# Patient Record
Sex: FEMALE | Race: WHITE | NOT HISPANIC OR LATINO | URBAN - METROPOLITAN AREA
[De-identification: names, ages, dates, MRNs, and addresses within clinical notes are randomized per-mention and may not be internally consistent; named-entity substitution may affect disease eponyms.]

---

## 2018-03-19 ENCOUNTER — HOSPITAL ENCOUNTER (EMERGENCY)
Facility: HOSPITAL | Age: 73
Discharge: HOME | End: 2018-03-19
Attending: EMERGENCY MEDICINE
Payer: MEDICARE

## 2018-03-19 ENCOUNTER — HOSPITAL ENCOUNTER (OUTPATIENT)
Facility: CLINIC | Age: 73
Discharge: OTHER INSTITUTION | End: 2018-03-19
Attending: FAMILY MEDICINE
Payer: MEDICARE

## 2018-03-19 VITALS
HEIGHT: 61 IN | DIASTOLIC BLOOD PRESSURE: 79 MMHG | OXYGEN SATURATION: 96 % | TEMPERATURE: 97.7 F | RESPIRATION RATE: 18 BRPM | WEIGHT: 155 LBS | SYSTOLIC BLOOD PRESSURE: 118 MMHG | HEART RATE: 75 BPM | BODY MASS INDEX: 29.27 KG/M2

## 2018-03-19 VITALS
TEMPERATURE: 99.1 F | WEIGHT: 155 LBS | HEIGHT: 61 IN | SYSTOLIC BLOOD PRESSURE: 75 MMHG | RESPIRATION RATE: 20 BRPM | BODY MASS INDEX: 29.27 KG/M2 | OXYGEN SATURATION: 95 % | HEART RATE: 82 BPM | DIASTOLIC BLOOD PRESSURE: 51 MMHG

## 2018-03-19 DIAGNOSIS — R05.9 COUGH: ICD-10-CM

## 2018-03-19 DIAGNOSIS — J10.1 INFLUENZA B: Primary | ICD-10-CM

## 2018-03-19 DIAGNOSIS — R55 SYNCOPE AND COLLAPSE: Primary | ICD-10-CM

## 2018-03-19 LAB
ANION GAP SERPL CALC-SCNC: 11 MEQ/L (ref 3–15)
ANISOCYTOSIS BLD QL SMEAR: ABNORMAL
BASOPHILS # BLD: 0.01 K/UL (ref 0.01–0.1)
BASOPHILS NFR BLD: 0.3 %
BUN SERPL-MCNC: 11 MG/DL (ref 8–20)
CALCIUM SERPL-MCNC: 8.8 MG/DL (ref 8.9–10.3)
CHLORIDE SERPL-SCNC: 102 MMOL/L (ref 98–109)
CO2 SERPL-SCNC: 24 MMOL/L (ref 22–32)
CREAT SERPL-MCNC: 0.8 MG/DL (ref 0.6–1.1)
EOSINOPHIL # BLD: 0.03 K/UL (ref 0.04–0.36)
EOSINOPHIL NFR BLD: 0.8 %
ERYTHROCYTE [DISTWIDTH] IN BLOOD BY AUTOMATED COUNT: 16.8 % (ref 11.7–14.4)
FLUAV RNA SPEC QL NAA+PROBE: NEGATIVE
FLUBV RNA SPEC QL NAA+PROBE: POSITIVE
GFR SERPL CREATININE-BSD FRML MDRD: >60 ML/MIN/1.73M*2
GLUCOSE BLD-MCNC: 93 MG/DL (ref 70–99)
GLUCOSE SERPL-MCNC: 96 MG/DL (ref 70–99)
HCT VFR BLDCO AUTO: 35 % (ref 35–45)
HGB BLD-MCNC: 11.3 G/DL (ref 11.8–15.7)
IMM GRANULOCYTES # BLD AUTO: 0.01 K/UL (ref 0–0.08)
IMM GRANULOCYTES NFR BLD AUTO: 0.3 %
INR PPP: 2.2 INR
LYMPHOCYTES # BLD: 0.82 K/UL (ref 1.2–3.5)
LYMPHOCYTES NFR BLD: 22.3 %
MCH RBC QN AUTO: 24.5 PG (ref 28–33.2)
MCHC RBC AUTO-ENTMCNC: 32.3 G/DL (ref 32.2–35.5)
MCV RBC AUTO: 75.9 FL (ref 83–98)
MICROCYTES BLD QL SMEAR: ABNORMAL
MONOCYTES # BLD: 0.46 K/UL (ref 0.28–0.8)
MONOCYTES NFR BLD: 12.5 %
NEUTROPHILS # BLD: 2.35 K/UL (ref 1.7–7)
NEUTS SEG NFR BLD: 63.8 %
NRBC BLD-RTO: 0 %
OVALOCYTES BLD QL SMEAR: ABNORMAL
PDW BLD AUTO: 10.7 FL (ref 9.4–12.3)
PLAT MORPH BLD: NORMAL
PLATELET # BLD AUTO: 235 K/UL (ref 150–369)
PLATELET # BLD EST: ABNORMAL 10*3/UL
POTASSIUM SERPL-SCNC: 3.7 MMOL/L (ref 3.6–5.1)
PROTHROMBIN TIME: 24.7 SEC. (ref 12.2–14.5)
RBC # BLD AUTO: 4.61 M/UL (ref 3.93–5.22)
SODIUM SERPL-SCNC: 137 MMOL/L (ref 136–144)
TROPONIN I SERPL-MCNC: <0.03 NG/ML
WBC # BLD AUTO: 3.68 K/UL (ref 3.8–10.5)

## 2018-03-19 PROCEDURE — 93005 ELECTROCARDIOGRAM TRACING: CPT

## 2018-03-19 PROCEDURE — 3E0337Z INTRODUCTION OF ELECTROLYTIC AND WATER BALANCE SUBSTANCE INTO PERIPHERAL VEIN, PERCUTANEOUS APPROACH: ICD-10-PCS | Performed by: EMERGENCY MEDICINE

## 2018-03-19 PROCEDURE — 84484 ASSAY OF TROPONIN QUANT: CPT | Performed by: EMERGENCY MEDICINE

## 2018-03-19 PROCEDURE — 85610 PROTHROMBIN TIME: CPT | Performed by: EMERGENCY MEDICINE

## 2018-03-19 PROCEDURE — 63600000 HC DRUGS/DETAIL CODE: Performed by: EMERGENCY MEDICINE

## 2018-03-19 PROCEDURE — 85025 COMPLETE CBC W/AUTO DIFF WBC: CPT | Performed by: EMERGENCY MEDICINE

## 2018-03-19 PROCEDURE — 80048 BASIC METABOLIC PNL TOTAL CA: CPT | Performed by: EMERGENCY MEDICINE

## 2018-03-19 PROCEDURE — 87502 INFLUENZA DNA AMP PROBE: CPT | Performed by: EMERGENCY MEDICINE

## 2018-03-19 PROCEDURE — 25800000 HC PHARMACY IV SOLUTIONS: Performed by: EMERGENCY MEDICINE

## 2018-03-19 PROCEDURE — 99285 EMERGENCY DEPT VISIT HI MDM: CPT | Mod: 25

## 2018-03-19 PROCEDURE — 99204 OFFICE O/P NEW MOD 45 MIN: CPT | Performed by: FAMILY MEDICINE

## 2018-03-19 PROCEDURE — 36415 COLL VENOUS BLD VENIPUNCTURE: CPT | Performed by: EMERGENCY MEDICINE

## 2018-03-19 PROCEDURE — 93005 ELECTROCARDIOGRAM TRACING: CPT | Performed by: EMERGENCY MEDICINE

## 2018-03-19 PROCEDURE — 96360 HYDRATION IV INFUSION INIT: CPT

## 2018-03-19 RX ORDER — HYDROCHLOROTHIAZIDE 25 MG/1
25 TABLET ORAL
COMMUNITY
Start: 2018-01-09 | End: 2021-01-16

## 2018-03-19 RX ORDER — OMEPRAZOLE 20 MG/1
20 CAPSULE, DELAYED RELEASE ORAL
COMMUNITY
Start: 2018-01-09

## 2018-03-19 RX ORDER — AMLODIPINE BESYLATE 5 MG/1
5 TABLET ORAL
COMMUNITY
Start: 2018-01-09

## 2018-03-19 RX ORDER — RIVAROXABAN 15 MG/1
TABLET, FILM COATED ORAL
Refills: 0 | COMMUNITY
Start: 2017-12-18 | End: 2018-03-19 | Stop reason: ENTERED-IN-ERROR

## 2018-03-19 RX ORDER — OSELTAMIVIR PHOSPHATE 75 MG/1
75 CAPSULE ORAL
Qty: 10 CAPSULE | Refills: 0 | Status: SHIPPED | OUTPATIENT
Start: 2018-03-19 | End: 2018-03-24

## 2018-03-19 RX ADMIN — SODIUM CHLORIDE 1000 ML: 9 INJECTION, SOLUTION INTRAVENOUS at 14:35

## 2018-03-19 ASSESSMENT — ENCOUNTER SYMPTOMS
DIARRHEA: 0
DECREASED APPETITE: 1
BACK PAIN: 0
COUGH: 1
NAUSEA: 1
SORE THROAT: 0
DIFFICULTY URINATING: 0
FREQUENCY: 0
FLU SYMPTOMS: 1
SHORTNESS OF BREATH: 0
FATIGUE: 1
BLOOD IN STOOL: 0
VOMITING: 0
RHINORRHEA: 1
HEADACHES: 0
MYALGIAS: 1
FEVER: 1
DYSURIA: 0

## 2018-03-19 NOTE — DISCHARGE INSTRUCTIONS
You presented with flu like syptoms.  Your blood pressure was low during triage, and while seated, you lost consciousness, and the medical assistant with you safely caught you and laid you down on the floor.  Mental status returned to normal within 2 minutes of LOC.  There was no shaking or loss of continence to suggest seizure.  EMS was called.  They arrived very quickly.  Verbal signout given by me to EMS, including above events, that she's on a blood thinner, and that I think she should have a head CT given age, being on blood thinner, and loss of consciousness.

## 2018-03-19 NOTE — ED PROVIDER NOTES
History  Chief Complaint   Patient presents with   • Cough     runny nose, myalgia, cough, fever (100)      See [facility] for brief eval prior to departing with EMS.  Exam & ROS were not full/complete due to emergency nature of syncope.  Appeared grossly normal, other than a little sweaty, after LOC resolved.             Past Medical History:   Diagnosis Date   • Hypertension    • Reflux esophagitis        History reviewed. No pertinent surgical history.    No family history on file.    Social History   Substance Use Topics   • Smoking status: Never Smoker   • Smokeless tobacco: Never Used   • Alcohol use No       Review of Systems    Physical Exam  ED Triage Vitals   Temp Heart Rate Resp BP SpO2   03/19/18 1134 03/19/18 1134 03/19/18 1134 03/19/18 1146 03/19/18 1134   37.3 °C (99.1 °F) 82 20 (!) 75/51 95 %      Temp Source Heart Rate Source Patient Position BP Location FiO2 (%) (Set)   03/19/18 1134 03/19/18 1134 03/19/18 1134 03/19/18 1134 --   Oral Monitor Sitting Left upper arm        Physical Exam   Constitutional: She is oriented to person, place, and time. She appears well-developed and well-nourished. No distress.   HENT:   Head: Normocephalic and atraumatic.   Neck: Neck supple.   Pulmonary/Chest: Effort normal.   Abdominal: Soft. There is no guarding.   Neurological: She is alert and oriented to person, place, and time.   Skin: She is diaphoretic.   Psychiatric: She has a normal mood and affect. Her behavior is normal.         Procedures  Procedures    UC Course  ED Course as of Mar 19 1155   Mon Mar 19, 2018   1154 Departed in [facility] at 11:55am with EMS, in stable condition.    [GJ]      ED Course User Index  [GJ] Ayush Wolfe MD         Clinical Impressions as of Mar 19 1155   Syncope and collapse   Cough       MDM  Number of Diagnoses or Management Options  Cough:   Syncope and collapse:   Diagnosis management comments: Transferred to ER via EMS for further eval of syncope & flu like symptoms.                   Ayush Wolfe MD  03/19/18 0173

## 2018-03-19 NOTE — ED PROVIDER NOTES
HPI     Chief Complaint   Patient presents with   • Flu Symptoms         History provided by:  Patient   used: No    Flu Symptoms   Presenting symptoms: cough, fatigue, fever, myalgias, nausea and rhinorrhea    Presenting symptoms: no diarrhea, no headaches, no shortness of breath, no sore throat and no vomiting    Severity:  Moderate  Onset quality:  Gradual  Chronicity:  New  Associated symptoms: decreased appetite and syncope (pt syncopized at urgent care)    Risk factors: being elderly and sick contacts ( w/ similar sx)    Risk factors comment:  Pt was dx'd w/ a PE in Dec 2017, unknown cause  Pt was seen at urgent care today for her flu-like sx where she had a syncopal event, was caught by nurse/doctor who was present. Pt notes she felt the urgent care was warm and believes that may have been why she passed out. Pt was significantly hypotensive at the urgent care w/ BP of 75/51.     Patient History     Past Medical History:   Diagnosis Date   • Hypertension    • Reflux esophagitis        History reviewed. No pertinent surgical history.    History reviewed. No pertinent family history.    Social History   Substance Use Topics   • Smoking status: Never Smoker   • Smokeless tobacco: Never Used   • Alcohol use No       Systems Reviewed from Nursing Triage:          Review of Systems     Review of Systems   Constitutional: Positive for decreased appetite, fatigue and fever.   HENT: Positive for rhinorrhea. Negative for sore throat.    Respiratory: Positive for cough. Negative for shortness of breath.    Cardiovascular: Negative for chest pain.   Gastrointestinal: Positive for nausea. Negative for blood in stool (no black stools), diarrhea and vomiting.   Genitourinary: Negative for difficulty urinating, dysuria and frequency.   Musculoskeletal: Positive for myalgias. Negative for back pain.   Neurological: Negative for headaches.   All other systems reviewed and are negative.       Physical  "Exam     ED Triage Vitals [03/19/18 1221]   Temp Heart Rate Resp BP SpO2   36.7 °C (98.1 °F) 70 16 123/73 95 %      Temp Source Heart Rate Source Patient Position BP Location FiO2 (%) (Set)   Oral -- -- -- --                     Patient Vitals for the past 24 hrs:   BP Temp Temp src Pulse Resp SpO2 Height Weight   03/19/18 1221 123/73 36.7 °C (98.1 °F) Oral 70 16 95 % 1.549 m (5' 1\") 70.3 kg (155 lb)           Physical Exam   Constitutional: She appears well-developed and well-nourished.   HENT:   Head: Normocephalic and atraumatic.   Eyes: Conjunctivae are normal.   Neck: Normal range of motion.   Cardiovascular: Normal rate, regular rhythm, normal heart sounds and intact distal pulses.    Pulmonary/Chest: Effort normal and breath sounds normal.   Abdominal: Soft. She exhibits no distension and no mass. There is no tenderness.   Musculoskeletal: Normal range of motion. She exhibits no tenderness.   Neurological: She is alert. No cranial nerve deficit.   Skin: Skin is warm and dry. Capillary refill takes less than 2 seconds.   Psychiatric: She has a normal mood and affect. Her behavior is normal.   Nursing note and vitals reviewed.           ECG 12 lead  Date/Time: 3/19/2018 1:34 PM  Performed by: CURTIS RICHTER  Authorized by: CURTIS RICHTER     Rate:     ECG rate:  68    ECG rate assessment: normal    Rhythm:     Rhythm: sinus rhythm    Ectopy:     Ectopy: none    QRS:     QRS axis:  Normal    QRS intervals:  Normal  ST segments:     ST segments:  Normal  T waves:     T waves: normal          ED Course & MDM     Labs Reviewed   POCT GLUCOSE (BEAKER) - Normal       Result Value    POCT Bedside Glucose 93     INFLUENZA A/B NUCLEIC ACID, PCR       ECG 12 lead    (Results Pending)           MDM  Number of Diagnoses or Management Options     Amount and/or Complexity of Data Reviewed  Clinical lab tests: reviewed      73 y/o F who presents w/ flu-like sx and syncopal event. Ddx includes orthostasis viral " illness, dehydration, renal failure.       ED Course as of Mar 19 2125   Mon Mar 19, 2018   1500 Patient feeling better, is influenza B positive.  Encouraged to increase fluid intake and follow up with PCP  [MALI]      ED Course User Index  [MALI] Beena Brennan MD         Clinical Impressions as of Mar 19 2125   Influenza B     Disposition:       Scribe Attestation  By signing my name below, I, Delonte Jean-Baptiste, attest that this documentation has been prepared under the direction and in the presence of Dr. Brennan.    3/19/2018 1:38 PM    I, Beena Brennan, personally performed the services described in this documentation, as documented by the scribe in my presence, and it is both accurate and complete.  3/19/2018 9:24 PM           Delonte Jean-Baptiste  03/19/18 1341       Beena Brennan MD  03/19/18 2125

## 2018-08-01 ENCOUNTER — APPOINTMENT (OUTPATIENT)
Dept: CT IMAGING | Facility: HOSPITAL | Age: 73
End: 2018-08-01
Payer: MEDICARE

## 2018-08-01 ENCOUNTER — HOSPITAL ENCOUNTER (EMERGENCY)
Facility: HOSPITAL | Age: 73
Discharge: HOME | End: 2018-08-01
Attending: EMERGENCY MEDICINE
Payer: MEDICARE

## 2018-08-01 VITALS
HEIGHT: 61 IN | OXYGEN SATURATION: 97 % | WEIGHT: 157 LBS | HEART RATE: 79 BPM | BODY MASS INDEX: 29.64 KG/M2 | TEMPERATURE: 98.4 F | SYSTOLIC BLOOD PRESSURE: 123 MMHG | DIASTOLIC BLOOD PRESSURE: 67 MMHG | RESPIRATION RATE: 16 BRPM

## 2018-08-01 DIAGNOSIS — Z86.711 HISTORY OF PULMONARY EMBOLUS (PE): ICD-10-CM

## 2018-08-01 DIAGNOSIS — R07.9 ACUTE CHEST PAIN: ICD-10-CM

## 2018-08-01 DIAGNOSIS — R07.9 CHEST PAIN, UNSPECIFIED TYPE: Primary | ICD-10-CM

## 2018-08-01 DIAGNOSIS — R73.9 HYPERGLYCEMIA: ICD-10-CM

## 2018-08-01 DIAGNOSIS — Z79.01 CHRONIC ANTICOAGULATION: ICD-10-CM

## 2018-08-01 LAB
ALBUMIN SERPL-MCNC: 4.3 G/DL (ref 3.5–5.3)
ALP SERPL-CCNC: 106 U/L (ref 55–142)
ALT SERPL-CCNC: 15 U/L (ref 0–52)
ANION GAP SERPL CALC-SCNC: 11 MMOL/L (ref 3–11)
AST SERPL-CCNC: 19 U/L (ref 0–39)
BASOPHILS # BLD AUTO: 0.1 10*3/UL
BASOPHILS NFR BLD AUTO: 1 % (ref 0–2)
BILIRUB SERPL-MCNC: 0.44 MG/DL (ref 0–1.4)
BNP SERPL-MCNC: 45 PG/ML (ref 0–99)
BUN SERPL-MCNC: 10 MG/DL (ref 7–25)
CALCIUM ALBUM COR SERPL-MCNC: 9.6 MG/DL (ref 8.5–10.1)
CALCIUM SERPL-MCNC: 9.8 MG/DL (ref 8.6–10.3)
CHLORIDE SERPL-SCNC: 102 MMOL/L (ref 98–107)
CO2 SERPL-SCNC: 23 MMOL/L (ref 21–32)
CREAT SERPL-MCNC: 0.7 MG/DL (ref 0.6–1.2)
EOSINOPHIL # BLD AUTO: 0.1 10*3/UL
EOSINOPHIL NFR BLD AUTO: 2 % (ref 0–3)
ERYTHROCYTE [DISTWIDTH] IN BLOOD BY AUTOMATED COUNT: 16.7 % (ref 11.5–14)
GFR SERPL CREATININE-BSD FRML MDRD: 82 ML/MIN/1.73M*2
GLUCOSE SERPL-MCNC: 143 MG/DL (ref 70–105)
HCT VFR BLD AUTO: 39.1 % (ref 34–45)
HGB BLD-MCNC: 12.9 G/DL (ref 11.5–15.5)
HYPOCHROMIA PRESENCE IN BLOOD, ANALYZER: ABNORMAL
LYMPHOCYTES # BLD AUTO: 1.5 10*3/UL
LYMPHOCYTES NFR BLD AUTO: 26 % (ref 11–47)
MCH RBC QN AUTO: 25.2 PG (ref 28–33)
MCHC RBC AUTO-ENTMCNC: 33 G/DL (ref 32–36)
MCV RBC AUTO: 76.4 FL (ref 81–97)
MICROCYTOSIS PRESENCE IN BLOOD, ANALYZER: ABNORMAL
MONOCYTES # BLD AUTO: 0.5 10*3/UL
MONOCYTES NFR BLD AUTO: 8 % (ref 3–11)
NEUTROPHILS # BLD AUTO: 3.7 10*3/UL
NEUTROPHILS NFR BLD AUTO: 63 % (ref 41–81)
PLATELET # BLD AUTO: 292 10*3/UL (ref 140–350)
PMV BLD AUTO: 9.3 FL (ref 6.9–10.8)
POTASSIUM SERPL-SCNC: 3.5 MMOL/L (ref 3.5–5.1)
PROT SERPL-MCNC: 7.8 G/DL (ref 6–8.3)
RBC # BLD AUTO: 5.11 10*6/ΜL (ref 3.7–5.3)
SODIUM SERPL-SCNC: 136 MMOL/L (ref 135–145)
TROPONIN I SERPL-MCNC: <0.03 NG/ML
WBC # BLD AUTO: 5.9 10*3/UL (ref 4.5–10.5)

## 2018-08-01 PROCEDURE — 99284 EMERGENCY DEPT VISIT MOD MDM: CPT

## 2018-08-01 PROCEDURE — 2580000300 HC RX 258: Performed by: EMERGENCY MEDICINE

## 2018-08-01 PROCEDURE — 36415 COLL VENOUS BLD VENIPUNCTURE: CPT | Performed by: EMERGENCY MEDICINE

## 2018-08-01 PROCEDURE — 80053 COMPREHEN METABOLIC PANEL: CPT | Performed by: EMERGENCY MEDICINE

## 2018-08-01 PROCEDURE — 99284 EMERGENCY DEPT VISIT MOD MDM: CPT | Performed by: EMERGENCY MEDICINE

## 2018-08-01 PROCEDURE — 6370000100 HC RX 637 (ALT 250 FOR IP): Performed by: EMERGENCY MEDICINE

## 2018-08-01 PROCEDURE — 85025 COMPLETE CBC W/AUTO DIFF WBC: CPT | Performed by: EMERGENCY MEDICINE

## 2018-08-01 PROCEDURE — 83880 ASSAY OF NATRIURETIC PEPTIDE: CPT | Performed by: EMERGENCY MEDICINE

## 2018-08-01 PROCEDURE — 2550000100 HC RX 255: Mod: JW | Performed by: EMERGENCY MEDICINE

## 2018-08-01 PROCEDURE — 71275 CT ANGIOGRAPHY CHEST: CPT

## 2018-08-01 PROCEDURE — 93005 ELECTROCARDIOGRAM TRACING: CPT | Performed by: EMERGENCY MEDICINE

## 2018-08-01 PROCEDURE — 84484 ASSAY OF TROPONIN QUANT: CPT | Performed by: EMERGENCY MEDICINE

## 2018-08-01 RX ORDER — HYDROCHLOROTHIAZIDE 25 MG/1
25 TABLET ORAL EVERY MORNING
COMMUNITY

## 2018-08-01 RX ORDER — IOPAMIDOL 755 MG/ML
80 INJECTION, SOLUTION INTRAVASCULAR ONCE
Status: COMPLETED | OUTPATIENT
Start: 2018-08-01 | End: 2018-08-01

## 2018-08-01 RX ORDER — NITROGLYCERIN 0.4 MG/1
0.4 TABLET SUBLINGUAL EVERY 5 MIN PRN
Status: DISCONTINUED | OUTPATIENT
Start: 2018-08-01 | End: 2018-08-01 | Stop reason: HOSPADM

## 2018-08-01 RX ORDER — SODIUM CHLORIDE 9 MG/ML
1000 INJECTION, SOLUTION INTRAVENOUS ONCE
Status: COMPLETED | OUTPATIENT
Start: 2018-08-01 | End: 2018-08-01

## 2018-08-01 RX ORDER — OMEPRAZOLE 20 MG/1
20 CAPSULE, DELAYED RELEASE ORAL EVERY MORNING
COMMUNITY

## 2018-08-01 RX ORDER — AMLODIPINE BESYLATE 5 MG/1
5 TABLET ORAL EVERY MORNING
COMMUNITY

## 2018-08-01 RX ADMIN — NITROGLYCERIN 0.4 MG: 0.4 TABLET, ORALLY DISINTEGRATING SUBLINGUAL at 14:00

## 2018-08-01 RX ADMIN — SODIUM CHLORIDE 1000 ML: 9 INJECTION, SOLUTION INTRAVENOUS at 14:00

## 2018-08-01 RX ADMIN — IOPAMIDOL 80 ML: 755 INJECTION, SOLUTION INTRAVENOUS at 14:40

## 2018-08-01 ASSESSMENT — PAIN DESCRIPTION - DESCRIPTORS
DESCRIPTORS: ACHING
DESCRIPTORS: ACHING

## 2018-08-01 NOTE — DISCHARGE INSTRUCTIONS
Take your medicines as prescribed.  Follow-up with your primary care physician.  Return to this emergency department if any symptoms worsen before then.

## 2018-08-01 NOTE — ED PROVIDER NOTES
Chest Pain (MID CHEST PAIN ON AND OFF SINCE YESTERDAY.  NONRADIATING.  ALSO C/O DIZZINESS AND FATIGUE.)        HPI:  This is a 72-year-old female reports emergency department with complaints of chest pain.    Patient states that approximately 24 hours ago she began experiencing chest pain.  She describes this to be an achy type discomfort in her left upper chest.  She has some radiation to her left shoulder.  She denies any sensation of diaphoresis or nausea, she does state that she has some shortness of breath.  She is unaware of anything that worsens or improves her pain.  Symptoms have been of moderate intensity since onset 24 hours ago.  Patient is traveling on a tour bus from [place].          Past Medical History:   Diagnosis Date   • GERD (gastroesophageal reflux disease)    • Hypertension        No past surgical history on file.    Social History     Social History   • Marital status:      Spouse name: N/A   • Number of children: N/A   • Years of education: N/A     Occupational History   • Not on file.     Social History Main Topics   • Smoking status: Not on file   • Smokeless tobacco: Not on file   • Alcohol use Not on file   • Drug use: Unknown   • Sexual activity: Not on file     Other Topics Concern   • Not on file     Social History Narrative   • No narrative on file       No family history on file.    No Known Allergies      Current Outpatient Prescriptions:   •  amLODIPine (NORVASC) 5 mg tablet, Take 5 mg by mouth every morning., Disp: , Rfl:   •  hydroCHLOROthiazide (HYDRODIURIL) 25 mg tablet, Take 25 mg by mouth every morning., Disp: , Rfl:   •  omeprazole (PriLOSEC) 20 mg capsule, Take 20 mg by mouth every morning., Disp: , Rfl:   •  rivaroxaban (XARELTO) 20 mg tablet, Take 20 mg by mouth every morning.  , Disp: , Rfl:       ROS:  Constitutional: negative for fever  Eyes: negative for visual changes  ENT: negative for sore throat  Cardiovascular: Positive for chest pain  Respiratory:  Positive for shortness of breath   GI: negative for abdominal pain  : negative for hematuria  Musculoskeletal: negative for back pain  Neuro: negative for headache  Hematology: negative for bleeding  Immunology: negative for joint pain      ED Triage Vitals   Temp Heart Rate Resp BP SpO2   08/01/18 1324 08/01/18 1324 08/01/18 1403 08/01/18 1324 08/01/18 1324   36.9 °C (98.4 °F) 84 16 172/84 97 %      Temp src Heart Rate Source Patient Position BP Location FiO2 (%)   -- -- 08/01/18 1403 -- --     Head of bed 30 degrees or higher           Physical Exam:  Nursing note and vitals reviewed.  Constitutional: Obese elderly female.  She answers questions appropriately and does not appear toxic.  Head: Normocephalic and atraumatic. Mucous membranes are moist.   Eyes: Pupils are equal, round, and reactive to light.   Neck: Supple.  Cardiovascular: Regular rate and rhythm without murmur.   Pulmonary/Chest: No respiratory distress.  Clear to auscultation bilaterally.  Abdominal: Soft and nontender.    Back: No CVA tenderness. No midline tenderness.   Musculoskeletal: No edema  Neurological: Alert.   Skin: Skin is warm and dry. No rash noted.   Psychiatric: Normal mood and affect.        Labs:  Labs Reviewed   CBC WITH AUTO DIFFERENTIAL - Abnormal        Result Value    WBC 5.9      RBC 5.11      Hemoglobin 12.9      Hematocrit 39.1      MCV 76.4 (*)     MCH 25.2 (*)     MCHC 33.0      RDW 16.7 (*)     Platelets 292      MPV 9.3      Neutrophils% 63      Lymphocytes% 26      Monocytes% 8      Eosinophils% 2      Basophils% 1      Neutrophils Absolute 3.70      Lymphocytes Absolute 1.50      Monocytes Absolute 0.50      Eosinophils Absolute 0.10      Basophils Absolute 0.10      Microcytosis 1+      Hypochromia 1+     COMPREHENSIVE METABOLIC PANEL - Abnormal     Sodium 136      Potassium 3.5      Chloride 102      CO2 23      Anion Gap 11      BUN 10      Creatinine 0.7      Glucose 143 (*)     Calcium 9.8      AST 19      ALT  (SGPT) 15      Alkaline Phosphatase 106      Total Protein 7.8      Albumin 4.3      Total Bilirubin 0.44      eGFR 82      Corrected Calcium 9.6      Narrative:     ESTIMATED GFR CALCULATED USING THE IDMS-TRACEABLE MDRD STUDY EQUATION WITH THE RESULT NORMALIZED TO 1.73M^2 BODY SURFACE AREA.    AVERAGE GFR BY AGE RANGE     20-30 years 116 mL/min/1.73m^2  30-40 years 107 mL/min/1.73m^2  40-50 years 99 mL/min/1.73m^2  50-60 years 93 mL/min/1.73m^2  60-70 years 85 mL/min/1.73m^2  70-up years 75 mL/min/1.73m^2   TROPONIN I - Normal    Troponin I <0.030     B-TYPE NATRIURETIC PEPTIDE (BNP) - Normal    BNP 45      Narrative:     B-TYPE NATRIURETIC PEPTIDE INTERPRETIVE DATA: A cutoff of 100 pg/mL has been demonstrated to provide the maximal combination of sensitivity, specificity, and negative predictive value for contributing to the diagnosis of congestive heart failure (CHF).  A BNP value greater than or equal to 100 pg/mL is consistent with a diagnosis of CHF in the appropriate clinical setting.       Imaging:  CT angiogram chest PE with IV contrast   Final Result   IMPRESSION:   No evidence of pulmonary emboli. There is either some fibrosis or atelectasis in the left lower lobe, especially surrounding a moderately large hiatal hernia sac.      There is some dilatation of the esophagus above the hernia sac. Potentially, this could represent fluid from reflux.          MDM:        Given   Medications   nitroglycerin (NITROSTAT) SL tablet 0.4 mg (0.4 mg sublingual Given 8/1/18 1400)   normal saline bolus 1,000 mL (1,000 mL intravenous New Bag/New Syringe 8/1/18 1400)   iopamidol (ISOVUE-370) 76 % injection 80 mL (80 mL intravenous Given 8/1/18 1440)         Procedure    ECG 12 lead  Date/Time: 8/1/2018 1:58 PM  Performed by: MICA JACKSON  Authorized by: MICA JACKSON     Comments:      Sinus rhythm with a rate of 84.  Leftward axis deviation.  Normal intervals.  Good R-wave progression.  No acute ischemic changes.  No  ectopy.              Clinical Impression:    Final diagnoses:   [R07.9] Chest pain, unspecified type   [Z79.01] Chronic anticoagulation   [Z86.711] History of pulmonary embolus (PE)   [R07.9] Acute chest pain   [R73.9] Hyperglycemia         I, Varun Oliveira MD, personally performed the services described in this documentation as transcribed by an emergency department scribe, in my presence, and it is both accurate and complete.      Varun Oliveira MD  08/01/18 7172

## 2018-08-01 NOTE — MEDICATION HISTORY SPECIALIST NOTES
CSN: [id number]  : [id number]    Information sources:  EPIC  List on patient's phone    Allergies verified.    Patient interviewed in ED. Updated Epic per patient and list on her phone. Patient verified med list, last doses, and allergies.     **High alert medications**  Rivoroxaban (Xarelto)

## 2018-08-17 ENCOUNTER — LAB REQUISITION (OUTPATIENT)
Dept: LAB | Facility: HOSPITAL | Age: 73
End: 2018-08-17
Attending: OBSTETRICS & GYNECOLOGY
Payer: MEDICARE

## 2018-08-17 DIAGNOSIS — R39.15 URGENCY OF URINATION: ICD-10-CM

## 2018-08-17 LAB
BACTERIA URNS QL MICRO: NORMAL /UL
BILIRUB UR QL STRIP.AUTO: NEGATIVE MG/DL
CLARITY UR REFRACT.AUTO: CLEAR
COLOR UR AUTO: YELLOW
GLUCOSE UR STRIP.AUTO-MCNC: NEGATIVE MG/DL
HGB UR QL STRIP.AUTO: NEGATIVE
HYALINE CASTS #/AREA URNS LPF: NORMAL /LPF
KETONES UR STRIP.AUTO-MCNC: NEGATIVE MG/DL
LEUKOCYTE ESTERASE UR QL STRIP.AUTO: NEGATIVE
NITRITE UR QL STRIP.AUTO: NEGATIVE
PH UR STRIP.AUTO: 6 [PH]
PROT UR QL STRIP.AUTO: NEGATIVE
RBC #/AREA URNS HPF: NORMAL /HPF
SP GR UR REFRACT.AUTO: 1.02
SQUAMOUS URNS QL MICRO: NORMAL /HPF
UROBILINOGEN UR STRIP-ACNC: 0.2 EU/DL
WBC #/AREA URNS HPF: NORMAL /HPF

## 2018-08-17 PROCEDURE — 81001 URINALYSIS AUTO W/SCOPE: CPT | Performed by: OBSTETRICS & GYNECOLOGY

## 2018-08-18 LAB — EXTRA TUBES HOLD SPECIMEN: NORMAL

## 2018-11-20 ENCOUNTER — HOSPITAL ENCOUNTER (OUTPATIENT)
Dept: RADIOLOGY | Age: 73
Discharge: HOME | End: 2018-11-20
Attending: REHABILITATION PRACTITIONER
Payer: MEDICARE

## 2018-11-20 ENCOUNTER — TRANSCRIBE ORDERS (OUTPATIENT)
Dept: RADIOLOGY | Age: 73
End: 2018-11-20

## 2018-11-20 DIAGNOSIS — M25.562 PAIN IN LEFT KNEE: Primary | ICD-10-CM

## 2018-11-20 DIAGNOSIS — M22.2X2 PATELLOFEMORAL DISORDER OF LEFT KNEE: ICD-10-CM

## 2018-11-20 DIAGNOSIS — M25.562 PAIN IN LEFT KNEE: ICD-10-CM

## 2018-11-20 PROCEDURE — 73564 X-RAY EXAM KNEE 4 OR MORE: CPT | Mod: LT

## 2019-09-20 ENCOUNTER — HOSPITAL ENCOUNTER (OUTPATIENT)
Facility: CLINIC | Age: 74
Discharge: HOME | End: 2019-09-20
Attending: FAMILY MEDICINE
Payer: MEDICARE

## 2019-09-20 VITALS
WEIGHT: 158 LBS | SYSTOLIC BLOOD PRESSURE: 124 MMHG | RESPIRATION RATE: 17 BRPM | BODY MASS INDEX: 29.83 KG/M2 | OXYGEN SATURATION: 96 % | HEIGHT: 61 IN | DIASTOLIC BLOOD PRESSURE: 73 MMHG | TEMPERATURE: 97.8 F | HEART RATE: 87 BPM

## 2019-09-20 DIAGNOSIS — M25.562 PAIN IN LATERAL PORTION OF LEFT KNEE: Primary | ICD-10-CM

## 2019-09-20 PROCEDURE — 99213 OFFICE O/P EST LOW 20 MIN: CPT | Performed by: FAMILY MEDICINE

## 2019-09-20 ASSESSMENT — ENCOUNTER SYMPTOMS
JOINT SWELLING: 0
COLOR CHANGE: 0

## 2019-09-20 NOTE — ED PROVIDER NOTES
History  Chief Complaint   Patient presents with   • Knee Injury     Two day history of L lateral knee pain that began after exercise.  She denies any history of injury.            Past Medical History:   Diagnosis Date   • Hypertension    • Reflux esophagitis        History reviewed. No pertinent surgical history.    History reviewed. No pertinent family history.    Social History   Substance Use Topics   • Smoking status: Never Smoker   • Smokeless tobacco: Never Used   • Alcohol use No       Review of Systems   Musculoskeletal: Negative for joint swelling.   Skin: Negative for color change.       Physical Exam  ED Triage Vitals [09/20/19 1321]   Temp Heart Rate Resp BP SpO2   36.6 °C (97.8 °F) 87 17 124/73 96 %      Temp src Heart Rate Source Patient Position BP Location FiO2 (%) (Set)   -- -- -- -- --       Physical Exam   Constitutional: She appears well-developed and well-nourished. No distress.   HENT:   Head: Normocephalic and atraumatic.   Eyes: Conjunctivae are normal.   Neck: Neck supple.   Cardiovascular: Normal rate and regular rhythm.    No murmur heard.  Pulmonary/Chest: Effort normal and breath sounds normal. No respiratory distress.   Abdominal: Soft. There is no tenderness.   Musculoskeletal: She exhibits no edema.        Left knee: She exhibits LCL laxity. She exhibits normal range of motion, no swelling and no effusion. Tenderness found. LCL tenderness noted. No medial joint line, no lateral joint line and no MCL tenderness noted.   Neurological: She is alert.   Skin: Skin is warm and dry.   Psychiatric: She has a normal mood and affect.   Nursing note and vitals reviewed.        Procedures  Procedures    UC Course  Clinical Impressions as of Sep 20 1404   Pain in lateral portion of left knee       MDM  Number of Diagnoses or Management Options  Pain in lateral portion of left knee:   Diagnosis management comments: May take tylenol for pain, elevate knee, may use ice for 24 hours or moist heat.   Avoid activities that cause pain and follow up with your physician if symptoms persist.                   Lennie Castillo MD  09/20/19 7220

## 2019-09-20 NOTE — DISCHARGE INSTRUCTIONS
May take tylenol for pain, elevate knee, may use ice for 24 hours or moist heat.  Avoid activities that cause pain and follow up with your physician if symptoms persist.

## 2019-12-10 ENCOUNTER — LAB REQUISITION (OUTPATIENT)
Dept: LAB | Facility: HOSPITAL | Age: 74
End: 2019-12-10
Attending: OBSTETRICS & GYNECOLOGY
Payer: MEDICARE

## 2019-12-10 DIAGNOSIS — Z01.419 ENCOUNTER FOR GYNECOLOGICAL EXAMINATION (GENERAL) (ROUTINE) WITHOUT ABNORMAL FINDINGS: ICD-10-CM

## 2019-12-10 PROCEDURE — G0145 SCR C/V CYTO,THINLAYER,RESCR: HCPCS | Performed by: OBSTETRICS & GYNECOLOGY

## 2019-12-19 LAB
CASE RPRT: NORMAL
CLINICAL INFO: NORMAL
THIN PREP CVX: NORMAL

## 2021-01-16 ENCOUNTER — APPOINTMENT (EMERGENCY)
Dept: RADIOLOGY | Facility: HOSPITAL | Age: 76
End: 2021-01-16
Attending: EMERGENCY MEDICINE
Payer: MEDICARE

## 2021-01-16 ENCOUNTER — HOSPITAL ENCOUNTER (EMERGENCY)
Facility: HOSPITAL | Age: 76
Discharge: HOME | End: 2021-01-16
Attending: EMERGENCY MEDICINE
Payer: MEDICARE

## 2021-01-16 VITALS
TEMPERATURE: 97.9 F | SYSTOLIC BLOOD PRESSURE: 156 MMHG | OXYGEN SATURATION: 99 % | HEART RATE: 72 BPM | WEIGHT: 156 LBS | DIASTOLIC BLOOD PRESSURE: 74 MMHG | HEIGHT: 61 IN | RESPIRATION RATE: 18 BRPM | BODY MASS INDEX: 29.45 KG/M2

## 2021-01-16 DIAGNOSIS — R07.9 CHEST PAIN, UNSPECIFIED TYPE: Primary | ICD-10-CM

## 2021-01-16 LAB
ALBUMIN SERPL-MCNC: 3.6 G/DL (ref 3.4–5)
ALP SERPL-CCNC: 88 IU/L (ref 35–126)
ALT SERPL-CCNC: 16 IU/L (ref 11–54)
ANION GAP SERPL CALC-SCNC: 11 MEQ/L (ref 3–15)
AST SERPL-CCNC: 21 IU/L (ref 15–41)
ATRIAL RATE: 80
BASOPHILS # BLD: 0.05 K/UL (ref 0.01–0.1)
BASOPHILS NFR BLD: 0.9 %
BILIRUB SERPL-MCNC: 0.8 MG/DL (ref 0.3–1.2)
BUN SERPL-MCNC: 11 MG/DL (ref 8–20)
CALCIUM SERPL-MCNC: 9.6 MG/DL (ref 8.9–10.3)
CHLORIDE SERPL-SCNC: 106 MEQ/L (ref 98–109)
CO2 SERPL-SCNC: 23 MEQ/L (ref 22–32)
CREAT SERPL-MCNC: 0.7 MG/DL (ref 0.6–1.1)
D DIMER PPP IA.FEU-MCNC: 1.22 UG/ML FEU (ref 0–0.5)
DIFFERENTIAL METHOD BLD: ABNORMAL
EOSINOPHIL # BLD: 0.17 K/UL (ref 0.04–0.36)
EOSINOPHIL NFR BLD: 3.2 %
ERYTHROCYTE [DISTWIDTH] IN BLOOD BY AUTOMATED COUNT: 16.5 % (ref 11.7–14.4)
GFR SERPL CREATININE-BSD FRML MDRD: >60 ML/MIN/1.73M*2
GLUCOSE SERPL-MCNC: 112 MG/DL (ref 70–99)
HCT VFR BLDCO AUTO: 40.4 % (ref 35–45)
HGB BLD-MCNC: 12.6 G/DL (ref 11.8–15.7)
IMM GRANULOCYTES # BLD AUTO: 0.01 K/UL (ref 0–0.08)
IMM GRANULOCYTES NFR BLD AUTO: 0.2 %
LYMPHOCYTES # BLD: 1.36 K/UL (ref 1.2–3.5)
LYMPHOCYTES NFR BLD: 25.3 %
MCH RBC QN AUTO: 25.2 PG (ref 28–33.2)
MCHC RBC AUTO-ENTMCNC: 31.2 G/DL (ref 32.2–35.5)
MCV RBC AUTO: 80.8 FL (ref 83–98)
MONOCYTES # BLD: 0.46 K/UL (ref 0.28–0.8)
MONOCYTES NFR BLD: 8.6 %
NEUTROPHILS # BLD: 3.32 K/UL (ref 1.7–7)
NEUTS SEG NFR BLD: 61.8 %
NRBC BLD-RTO: 0 %
P AXIS: 54
PDW BLD AUTO: 11.3 FL (ref 9.4–12.3)
PLATELET # BLD AUTO: 291 K/UL (ref 150–369)
POTASSIUM SERPL-SCNC: 4.1 MEQ/L (ref 3.6–5.1)
PR INTERVAL: 156
PROT SERPL-MCNC: 7.2 G/DL (ref 6–8.2)
QRS DURATION: 80
QT INTERVAL: 414
QTC CALCULATION(BAZETT): 477
R AXIS: 5
RBC # BLD AUTO: 5 M/UL (ref 3.93–5.22)
SODIUM SERPL-SCNC: 140 MEQ/L (ref 136–144)
T WAVE AXIS: 21
TROPONIN I SERPL-MCNC: <0.03 NG/ML
TROPONIN I SERPL-MCNC: <0.03 NG/ML
VENTRICULAR RATE: 80
WBC # BLD AUTO: 5.37 K/UL (ref 3.8–10.5)

## 2021-01-16 PROCEDURE — 80053 COMPREHEN METABOLIC PANEL: CPT | Performed by: EMERGENCY MEDICINE

## 2021-01-16 PROCEDURE — 84484 ASSAY OF TROPONIN QUANT: CPT | Performed by: EMERGENCY MEDICINE

## 2021-01-16 PROCEDURE — 85379 FIBRIN DEGRADATION QUANT: CPT | Performed by: EMERGENCY MEDICINE

## 2021-01-16 PROCEDURE — 36415 COLL VENOUS BLD VENIPUNCTURE: CPT | Performed by: EMERGENCY MEDICINE

## 2021-01-16 PROCEDURE — 85025 COMPLETE CBC W/AUTO DIFF WBC: CPT | Performed by: EMERGENCY MEDICINE

## 2021-01-16 PROCEDURE — 63600105 HC IODINE BASED CONTRAST: Performed by: EMERGENCY MEDICINE

## 2021-01-16 PROCEDURE — 93005 ELECTROCARDIOGRAM TRACING: CPT | Performed by: EMERGENCY MEDICINE

## 2021-01-16 PROCEDURE — 71260 CT THORAX DX C+: CPT | Mod: ME

## 2021-01-16 PROCEDURE — 71045 X-RAY EXAM CHEST 1 VIEW: CPT

## 2021-01-16 PROCEDURE — 99284 EMERGENCY DEPT VISIT MOD MDM: CPT | Mod: 25

## 2021-01-16 PROCEDURE — 73030 X-RAY EXAM OF SHOULDER: CPT | Mod: LT

## 2021-01-16 RX ORDER — CHOLECALCIFEROL (VITAMIN D3) 25 MCG
1000 TABLET ORAL DAILY
COMMUNITY

## 2021-01-16 RX ADMIN — IOHEXOL 80 ML: 350 INJECTION, SOLUTION INTRAVENOUS at 13:30

## 2021-01-16 ASSESSMENT — ENCOUNTER SYMPTOMS
TROUBLE SWALLOWING: 0
ALTERED MENTAL STATUS: 0
SYNCOPE: 0
HEARTBURN: 0
SHORTNESS OF BREATH: 0
ORTHOPNEA: 0
BACK PAIN: 0
ABDOMINAL PAIN: 0
ANOREXIA: 0
FEVER: 0
VOMITING: 0

## 2021-01-16 NOTE — ED PROVIDER NOTES
HPI     Chief Complaint   Patient presents with   • Chest Pain       76 yo female with pmh of HTN and PE who presents with left shoulder pain that started today.  It has been constant since it started and radiates into left chest.  Nothing makes it better or worse.  No n/v, no diaphoresis, no sob, no leg pain or swelling.  No fever/chills, no cough.        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 No pertinent surgical history.    History reviewed. No pertinent family history.    Social History     Tobacco Use   • Smoking status: Never Smoker   • Smokeless tobacco: Never Used   Substance Use Topics   • Alcohol use: No   • Drug use: Not Currently       Systems Reviewed from Nursing Triage:          Review of Systems     Review of Systems   Constitutional: Negative for fever.   HENT: Negative for trouble swallowing.    Respiratory: Negative for shortness of breath.    Cardiovascular: Positive for chest pain. Negative for orthopnea and syncope.   Gastrointestinal: Negative for abdominal pain, anorexia, heartburn and vomiting.   Musculoskeletal: Negative for back pain.   All other systems reviewed and are negative.       Physical Exam     ED Vitals    Date/Time Temp Pulse Resp BP SpO2 [facility]   01/16/21 1411 -- 73 16 166/77 98 %  JL   01/16/21 1345 -- 76 14 133/74 98 % KMP   01/16/21 1200 -- 73 18 137/71 99 % RJA   01/16/21 1049 36.6 °C (97.9 °F) 78 16 162/74 97 % MRP                                                           Physical Exam  Constitutional:       Appearance: She is well-developed.   HENT:      Head: Normocephalic and atraumatic.   Neck:      Musculoskeletal: Normal range of motion and neck supple.   Cardiovascular:      Rate and Rhythm: Normal rate and regular rhythm.      Heart sounds: Normal heart sounds.   Pulmonary:      Effort: Pulmonary effort is normal.   Abdominal:      General: Bowel sounds are normal.      Palpations: Abdomen is soft.      Tenderness: There is no abdominal tenderness.   Musculoskeletal: Normal range of motion.   Skin:     General: Skin is warm and dry.      Capillary Refill: Capillary refill takes less than 2 seconds.   Neurological:      General: No focal deficit present.      Mental Status: She is alert and oriented to person, place, and time.   Psychiatric:         Mood and Affect: Mood normal.              Procedures    Results     Procedure Component Value Units Date/Time    D-dimer, quantitative [[id number]]  (Abnormal) Collected: 01/16/21 1106    Specimen: Blood, Venous Updated: 01/16/21 1259     D-Dimer, Quant 1.22 ug/mL FEU      Comment: Suggested Age Related Reference Range: 0.00 - 0.77  The D-Dimer assay can be used as an aid in the diagnosis of DVT or PE. The test can not be used by itself to exclude DVT or PE. When used as a diagnostic aid, the cutoff value is the same as the reference range: <0.5 ug/ml FEU.       Comprehensive metabolic panel [[id number]]  (Abnormal) Collected: 01/16/21 1106    Specimen: Blood, Venous Updated: 01/16/21 1143     Sodium 140 mEQ/L      Potassium 4.1 mEQ/L      Comment: Results obtained on plasma. Plasma Potassium values may be up to 0.4 mEQ/L less than serum values. The differences may be greater for patients with high platelet or white cell counts.         Chloride 106 mEQ/L      CO2 23 mEQ/L      BUN 11 mg/dL      Creatinine 0.7 mg/dL      Glucose 112 mg/dL      Calcium 9.6 mg/dL      AST (SGOT) 21 IU/L      ALT (SGPT) 16 IU/L      Alkaline Phosphatase 88 IU/L      Total Protein 7.2 g/dL      Comment: Test performed on plasma which typically contains approximately 0.4 g/dL more protein than serum.        Albumin 3.6 g/dL      Bilirubin, Total 0.8 mg/dL      eGFR >60.0 mL/min/1.73m*2      Anion Gap 11 mEQ/L     Troponin I [[id number]]  (Normal) Collected: 01/16/21 1106    Specimen: Blood, Venous Updated: 01/16/21 1141     Troponin I <0.03 ng/mL     CBC and differential [[id number]]  (Abnormal) Collected: 01/16/21 1106    Specimen: Blood, Venous Updated: 01/16/21 1122     WBC 5.37 K/uL      RBC 5.00 M/uL      Hemoglobin 12.6 g/dL      Hematocrit 40.4 %      MCV 80.8 fL      MCH 25.2 pg      MCHC 31.2 g/dL      RDW 16.5 %      Platelets 291 K/uL      MPV 11.3 fL      Differential Type Auto     nRBC 0.0 %      Immature Granulocytes 0.2 %      Neutrophils 61.8 %      Lymphocytes 25.3 %      Monocytes 8.6 %      Eosinophils 3.2 %      Basophils 0.9 %      Immature Granulocytes, Absolute 0.01 K/uL      Neutrophils, Absolute 3.32 K/uL      Lymphocytes, Absolute 1.36 K/uL      Monocytes, Absolute 0.46 K/uL      Eosinophils, Absolute 0.17 K/uL      Basophils, Absolute 0.05 K/uL     [place] Draw Panel [[id number]] Collected: 01/16/21 1106    Specimen: Blood, Venous Updated: 01/16/21 1118    Narrative:      The following orders were created for panel order [place] Draw Panel.  Procedure                               Abnormality         Status                     ---------                               -----------         ------                     [employer] LT BLUE[[id number]]                                  In process                   Please view results for these tests on the individual orders.    [employer] BLUE [[id number]] Collected: 01/16/21 1106    Specimen: Blood, Venous  Updated: 01/16/21 1118          Imaging Results          CT CHEST PULMONARY EMBOLISM WITH IV CONTRAST (Final result)  Result time 01/16/21 14:12:00    Final result                 Impression:    IMPRESSION:  1.  No pulmonary embolus.  2.  New tubular lesion within the left upper lobe apex measuring up to 1.7 x 0.5  cm, likely representing mucoid impacted bronchus.  Follow-up nonenhanced chest  CT in 3-6 months can be considered to assess for stability/resolution.                   Narrative:    CLINICAL HISTORY: Positive d-dimer, chest pain    COMPARISON: CT 12/13/2017    COMMENT:    TECHNIQUE: CT of the chest was performed with the patient in the supine  position. Images reconstructed/reformatted in the axial, coronal and  sagittal  plane.  CT DOSE:  One or more dose reduction techniques (e.g. automated exposure  control, adjustment of the mA and/or kV according to patient size, use of  iterative reconstruction technique) utilized for this examination.  INTRAVENOUS CONTRAST: 80 mL of Omnipaque 350 intravenous contrast was  administered without event.  Image quality and contrast bolus timing:  Satisfactory    CHEST WALL AND LOWER NECK:  Unchanged partially calcified left thyroid nodule measuring 1.8 cm.  Partially retropharyngeal common carotid artery course, normal variant.  MEDIASTINUM AND BHARAT:  Moderate hiatal hernia again seen.  Moderate amount of debris within the esophagus.  HEART: Cardiomegaly. No pericardial effusion.  VESSELS:  Atherosclerotic disease of the thoracic aorta.  Normal caliber aorta with the ascending aorta measuring up to 3.6 cm.  Main pulmonary artery caliber is normal.  No primary embolus.  UPPER ABDOMEN:within normal limits.  LUNG, LARGE AIRWAYS AND PLEURA:  Right basilar linear subsegmental volume loss.  Left lower lobe posterior medial subsegmental volume loss adjacent to the hernia  sac.  Unchanged right middle lobe 0.5 cm pulmonary nodule.  New tubular left upper  lobe apical lesion  measuring 1.7 cm craniocaudad in axial image 39 & 0.5 cm AP  on thin slice axial image 46.  BONES: Multilevel degenerative disc disease per                               X-RAY SHOULDER LEFT 2+ VIEWS (Final result)  Result time 01/16/21 12:52:37    Final result                 Impression:    IMPRESSION:No evidence of left shoulder fracture or dislocation.    COMMENT:2 views left shoulder performed.  No prior studies for comparison.  Normal glenohumeral alignment.  Acromioclavicular intervals normal.  No evidence  of an acute fracture or soft tissue swelling.  Mid left arm posterior lateral  soft tissue calcifications with lucent centers, possibly related to a  hemangioma.  Imaged portions of the left lung are clear.             Narrative:    CLINICAL HISTORY: shoulder pain                               X-RAY CHEST 1 VIEW (Final result)  Result time 01/16/21 11:41:23    Final result                 Impression:    IMPRESSION:No evidence of acute disease.    COMMENT:Frontal portable erect view the chest performed.  Comparison made with  examinations dating back to 12/12/2017.  Hiatal hernia again seen.  Left basilar  linear subsegmental volume loss; lungs otherwise clear.  No evidence of a pleural effusion, pneumothorax or pneumomediastinum.             Narrative:    CLINICAL HISTORY: chest pain                                ECG 12 lead   Final Result                  ED Course & MDM     MDM         ED Course as of Jan 17 0713   Sat Jan 16, 2021   1331 Heart score is 3 - will do second troponin - if negative can refer for outpatient follow up    [MALI]   1711 The repeat troponin is negative.  She plans to follow-up with a cardiologist as an outpatient as well as with her primary doctor.  She was given Dr. Pearce's number.  She understands that she should return to the ER if worse in any way before then.  I also explained her CT results to her and her need for a repeat unenhanced CT scan in 3 to 6 months.    [HS]      ED  Course User Index  [HS] Piero Parr (Ed) MD [place]  [MALI] Beena Brennan MD         Clinical Impressions as of Jan 17 0713   Chest pain, unspecified type        Beena Brennan MD  01/16/21 1456       Beena Brennan MD  01/17/21 0794

## 2021-02-05 ENCOUNTER — IMMUNIZATIONS (OUTPATIENT)
Dept: FAMILY MEDICINE CLINIC | Facility: HOSPITAL | Age: 76
End: 2021-02-05

## 2021-02-05 DIAGNOSIS — Z23 ENCOUNTER FOR IMMUNIZATION: Primary | ICD-10-CM

## 2021-02-05 PROCEDURE — 91301 SARS-COV-2 / COVID-19 MRNA VACCINE (MODERNA) 100 MCG: CPT

## 2021-02-05 PROCEDURE — 0011A SARS-COV-2 / COVID-19 MRNA VACCINE (MODERNA) 100 MCG: CPT

## 2021-03-04 ENCOUNTER — IMMUNIZATIONS (OUTPATIENT)
Dept: FAMILY MEDICINE CLINIC | Facility: HOSPITAL | Age: 76
End: 2021-03-04

## 2021-03-04 DIAGNOSIS — Z23 ENCOUNTER FOR IMMUNIZATION: Primary | ICD-10-CM

## 2021-03-04 PROCEDURE — 0012A SARS-COV-2 / COVID-19 MRNA VACCINE (MODERNA) 100 MCG: CPT

## 2021-03-04 PROCEDURE — 91301 SARS-COV-2 / COVID-19 MRNA VACCINE (MODERNA) 100 MCG: CPT

## 2021-03-05 ENCOUNTER — HOSPITAL ENCOUNTER (EMERGENCY)
Facility: HOSPITAL | Age: 76
Discharge: HOME | End: 2021-03-05
Attending: EMERGENCY MEDICINE
Payer: MEDICARE

## 2021-03-05 ENCOUNTER — APPOINTMENT (EMERGENCY)
Dept: RADIOLOGY | Facility: HOSPITAL | Age: 76
End: 2021-03-05
Payer: MEDICARE

## 2021-03-05 VITALS
BODY MASS INDEX: 29.64 KG/M2 | WEIGHT: 157 LBS | DIASTOLIC BLOOD PRESSURE: 68 MMHG | SYSTOLIC BLOOD PRESSURE: 135 MMHG | OXYGEN SATURATION: 100 % | TEMPERATURE: 100.6 F | RESPIRATION RATE: 12 BRPM | HEIGHT: 61 IN | HEART RATE: 72 BPM

## 2021-03-05 DIAGNOSIS — R06.02 SHORTNESS OF BREATH: Primary | ICD-10-CM

## 2021-03-05 DIAGNOSIS — R50.9 FEVER, UNSPECIFIED FEVER CAUSE: ICD-10-CM

## 2021-03-05 PROBLEM — K21.00 GASTROESOPHAGEAL REFLUX DISEASE WITH ESOPHAGITIS: Status: ACTIVE | Noted: 2017-11-24

## 2021-03-05 PROBLEM — I26.92 ACUTE SADDLE PULMONARY EMBOLISM WITHOUT ACUTE COR PULMONALE (CMS/HCC): Status: ACTIVE | Noted: 2021-03-05

## 2021-03-05 LAB
ATRIAL RATE: 88
P AXIS: 42
PR INTERVAL: 154
QRS DURATION: 80
QT INTERVAL: 394
QTC CALCULATION(BAZETT): 476
R AXIS: 0
T WAVE AXIS: 39
VENTRICULAR RATE: 88

## 2021-03-05 PROCEDURE — 93005 ELECTROCARDIOGRAM TRACING: CPT | Performed by: PHYSICIAN ASSISTANT

## 2021-03-05 PROCEDURE — 71045 X-RAY EXAM CHEST 1 VIEW: CPT

## 2021-03-05 PROCEDURE — 63700000 HC SELF-ADMINISTRABLE DRUG: Performed by: PHYSICIAN ASSISTANT

## 2021-03-05 PROCEDURE — 99283 EMERGENCY DEPT VISIT LOW MDM: CPT | Mod: 25

## 2021-03-05 PROCEDURE — 93010 ELECTROCARDIOGRAM REPORT: CPT | Performed by: INTERNAL MEDICINE

## 2021-03-05 RX ORDER — ACETAMINOPHEN 325 MG/1
650 TABLET ORAL ONCE
Status: COMPLETED | OUTPATIENT
Start: 2021-03-05 | End: 2021-03-05

## 2021-03-05 RX ADMIN — ACETAMINOPHEN 650 MG: 325 TABLET, FILM COATED ORAL at 10:06

## 2021-03-05 ASSESSMENT — ENCOUNTER SYMPTOMS
DIAPHORESIS: 0
MYALGIAS: 1
CHILLS: 1
VOMITING: 0
WEAKNESS: 0
ABDOMINAL PAIN: 0
NAUSEA: 0
SHORTNESS OF BREATH: 1
COLOR CHANGE: 0
HEADACHES: 0
DIZZINESS: 0
LIGHT-HEADEDNESS: 0
COUGH: 0
FEVER: 0

## 2021-03-05 NOTE — DISCHARGE INSTRUCTIONS
Please follow up as directed to obtain any final results and review your plan of care. CALL your primary doctor's office today to schedule a follow up appointment within the next 48 hours.       REVIEW AND DISCUSS ALL OF YOUR MEDICATIONS WITH YOUR PRIMARY CARE TEAM WITHIN THE NEXT 2 DAYS, even ones that you may have been on for a long time.      Radiology review of your studies (XRAYs, CT Scans, Ultrasounds, MRI scans) may still be pending. Preliminary results may be available today but final written reports may not be available until tomorrow. Important findings may be included in the final radiology review.       If instructed please CALL the Emergency Department at [phone] to obtain the final results within the next 24 hours. Review the final results of all of your tests with your primary care doctor within the next 2 days.      Cultures and uncommon labs may take 2 days or more before results are available. Please ask about all pending tests until the final results are known. You may not be notified of important test results unless you ask for these when you call or when you follow up.      **PLEASE RETURN TO THE EMERGENCY DEPARTMENT IF YOU DEVELOP ANY NEW OR WORSENING SYMPTOMS**

## 2021-03-05 NOTE — ED PROVIDER NOTES
Emergency Medicine Note  HPI   HISTORY OF PRESENT ILLNESS     75-year-old female with PMH of PE anticoagulated on Xarelto and HTN presents emergency department for shortness of breath that started this morning.  Patient states that she feels as if her breathing has labor, has improved since it began.  Patient states that she has a history of PE and was nervous so she came to emergency for further evaluation.  Patient received her second maternal Covid vaccine yesterday.  Patient also notes a generalized myalgias and mild intermittent headache similar to previous.  Patient denies any chest pain or coughing out of the ordinary.  Patient has mild chills; denies fever or diaphoresis.  Patient denies any lightheadedness, dizziness, abdominal pain, nausea, vomiting, or diarrhea.  Patient has been taking her Xarelto as prescribed, denies missed doses.      History provided by:  Patient   used: No    Shortness of Breath  Associated symptoms: no abdominal pain, no chest pain, no cough, no diaphoresis, no fever, no headaches, no rash and no vomiting          Patient History   PAST HISTORY     Reviewed from Nursing Triage:      Past Medical History:   Diagnosis Date   • Hypertension    • PE (pulmonary thromboembolism) (CMS/HCC)    • Reflux esophagitis        History reviewed. No pertinent surgical history.    History reviewed. No pertinent family history.    Social History     Tobacco Use   • Smoking status: Never Smoker   • Smokeless tobacco: Never Used   Substance Use Topics   • Alcohol use: No   • Drug use: Not Currently         Review of Systems   REVIEW OF SYSTEMS     Review of Systems   Constitutional: Positive for chills. Negative for diaphoresis and fever.   HENT: Negative for nosebleeds.    Eyes: Negative for visual disturbance.   Respiratory: Positive for shortness of breath. Negative for cough.    Cardiovascular: Negative for chest pain and leg swelling.   Gastrointestinal: Negative for abdominal  pain, nausea and vomiting.   Musculoskeletal: Positive for myalgias.   Skin: Negative for color change and rash.   Neurological: Negative for dizziness, weakness, light-headedness and headaches.   All other systems reviewed and are negative.        VITALS     ED Vitals    Date/Time Temp Pulse Resp BP SpO2 [facility]   03/05/21 1006 38.1 °C (100.6 °F) -- -- -- -- VLT   03/05/21 1000 -- 72 12 135/68 100 % VLT   03/05/21 0837 37.7 °C (99.9 °F) 86 22 150/74 99 % KAB        Pulse Ox %: 99 % (03/05/21 0838)  Pulse Ox Interpretation: Normal (03/05/21 0838)  Heart Rate: 86 (03/05/21 0838)  Rhythm Strip Interpretation: Normal Sinus Rhythm (03/05/21 0838)     Physical Exam   PHYSICAL EXAM     Physical Exam  Vitals signs and nursing note reviewed.   Constitutional:       General: She is not in acute distress.     Appearance: She is well-developed. She is not ill-appearing or diaphoretic.   HENT:      Head: Normocephalic and atraumatic.   Eyes:      Extraocular Movements: Extraocular movements intact.      Conjunctiva/sclera: Conjunctivae normal.   Neck:      Musculoskeletal: Normal range of motion.   Cardiovascular:      Rate and Rhythm: Normal rate and regular rhythm.      Heart sounds: Normal heart sounds.   Pulmonary:      Effort: Pulmonary effort is normal. No respiratory distress.      Breath sounds: Normal breath sounds. No decreased breath sounds, wheezing, rhonchi or rales.   Chest:      Chest wall: No tenderness.   Musculoskeletal: Normal range of motion.   Skin:     General: Skin is warm and dry.      Capillary Refill: Capillary refill takes less than 2 seconds.   Neurological:      Mental Status: She is alert and oriented to person, place, and time.   Psychiatric:         Mood and Affect: Mood normal.           PROCEDURES     Procedures     DATA     Results     None          Imaging Results          X-RAY CHEST 1 VIEW (Final result)  Result time 03/05/21 10:11:51    Final result                 Impression:     IMPRESSION:  No evidence of acute pulmonary disease.               Narrative:    CLINICAL HISTORY:   Shortness of breath.    COMMENT:    Comparison:   Chest radiographs dating 12/12/2017.    Single frontal AP view of the chest was obtained.    The cardiac size and mediastinal contour are stable.  The lungs are clear for  technique.  No pleural effusion or evidence of a pneumothorax.                                ECG 12 lead   Final Result          Scoring tools                               ED Course & MDM   MDM / ED COURSE and CLINICAL IMPRESSIONS     MDM  Number of Diagnoses or Management Options  Fever, unspecified fever cause:   Shortness of breath:   Diagnosis management comments: 75-year-old female with PMH of PE anticoagulated on Xarelto and HTN presents emergency department for shortness of breath that started this morning.  Differential diagnosis is broad.  Patient is not hypoxic or tachycardic.  Patient has been taking Xarelto as prescribed.  I doubt PE at this time.  Will get CXR and EKG then reevaluate.  Patient's temperature is elevated at 99.9 Fahrenheit, patient is not febrile however.  Will give Tylenol.       Amount and/or Complexity of Data Reviewed  Tests in the radiology section of CPT®: reviewed        ED Course as of Mar 05 1411   Fri Mar 05, 2021   1019 EKG is not reveal any acute pathology.  CXR does not reveal an acute pathology.  Patient has a low-grade fever.  Patient symptoms most likely due to recent Covid vaccine administration.  Patient well-appearing.  Discussed everything with patient, states she understands.  Vital signs are stable, pt discharged home. Pt given strict precautions to return to the ED. Pt to follow-up with PCP in 2 days as well as any other providers as indicated.     [KM]      ED Course User Index  [KM] Tamar Castanon PA C         Clinical Impressions as of Mar 05 1411   Shortness of breath   Fever, unspecified fever cause            Tamar Castanon PA  C  03/05/21 1417

## 2021-03-05 NOTE — ED ATTESTATION NOTE
The patient was evaluated and managed by the physician assistant.    Case discussed with PA, I supervised care and agree with plan.     Mathieu Guzman MD  03/05/21 2543

## 2021-04-15 ENCOUNTER — TRANSCRIBE ORDERS (OUTPATIENT)
Dept: SCHEDULING | Age: 76
End: 2021-04-15

## 2021-04-15 DIAGNOSIS — R91.1 SOLITARY PULMONARY NODULE: Primary | ICD-10-CM

## 2021-04-29 ENCOUNTER — HOSPITAL ENCOUNTER (OUTPATIENT)
Dept: RADIOLOGY | Age: 76
Discharge: HOME | End: 2021-04-29
Attending: INTERNAL MEDICINE
Payer: MEDICARE

## 2021-04-29 ENCOUNTER — TRANSCRIBE ORDERS (OUTPATIENT)
Dept: LAB | Age: 76
End: 2021-04-29

## 2021-04-29 ENCOUNTER — APPOINTMENT (OUTPATIENT)
Dept: LAB | Age: 76
End: 2021-04-29
Attending: INTERNAL MEDICINE
Payer: MEDICARE

## 2021-04-29 DIAGNOSIS — I51.89 OTHER ILL-DEFINED HEART DISEASES: Primary | ICD-10-CM

## 2021-04-29 DIAGNOSIS — I51.89 OTHER ILL-DEFINED HEART DISEASES: ICD-10-CM

## 2021-04-29 DIAGNOSIS — R91.1 SOLITARY PULMONARY NODULE: ICD-10-CM

## 2021-04-29 LAB
ALBUMIN SERPL-MCNC: 3.4 G/DL (ref 3.4–5)
ALP SERPL-CCNC: 86 IU/L (ref 35–126)
ALT SERPL-CCNC: 14 IU/L (ref 11–54)
ANION GAP SERPL CALC-SCNC: 8 MEQ/L (ref 3–15)
AST SERPL-CCNC: 16 IU/L (ref 15–41)
BILIRUB SERPL-MCNC: 0.6 MG/DL (ref 0.3–1.2)
BUN SERPL-MCNC: 13 MG/DL (ref 8–20)
CALCIUM SERPL-MCNC: 9.6 MG/DL (ref 8.9–10.3)
CHLORIDE SERPL-SCNC: 106 MEQ/L (ref 98–109)
CO2 SERPL-SCNC: 24 MEQ/L (ref 22–32)
CREAT SERPL-MCNC: 0.7 MG/DL (ref 0.6–1.1)
GFR SERPL CREATININE-BSD FRML MDRD: >60 ML/MIN/1.73M*2
GLUCOSE SERPL-MCNC: 106 MG/DL (ref 70–99)
POTASSIUM SERPL-SCNC: 4.5 MEQ/L (ref 3.6–5.1)
PROT SERPL-MCNC: 6.1 G/DL (ref 6–8.2)
SODIUM SERPL-SCNC: 138 MEQ/L (ref 136–144)

## 2021-04-29 PROCEDURE — 80053 COMPREHEN METABOLIC PANEL: CPT

## 2021-04-29 PROCEDURE — 36415 COLL VENOUS BLD VENIPUNCTURE: CPT

## 2021-04-29 PROCEDURE — 71250 CT THORAX DX C-: CPT

## 2021-11-01 ENCOUNTER — TRANSCRIBE ORDERS (OUTPATIENT)
Dept: SCHEDULING | Age: 76
End: 2021-11-01
Payer: MEDICARE

## 2021-11-01 DIAGNOSIS — R91.8 OTHER NONSPECIFIC ABNORMAL FINDING OF LUNG FIELD: Primary | ICD-10-CM

## 2021-11-04 ENCOUNTER — HOSPITAL ENCOUNTER (OUTPATIENT)
Dept: RADIOLOGY | Age: 76
Discharge: HOME | End: 2021-11-04
Attending: INTERNAL MEDICINE
Payer: MEDICARE

## 2021-11-04 DIAGNOSIS — R91.8 OTHER NONSPECIFIC ABNORMAL FINDING OF LUNG FIELD: ICD-10-CM

## 2021-11-04 PROCEDURE — 71250 CT THORAX DX C-: CPT

## 2022-04-26 ENCOUNTER — TRANSCRIBE ORDERS (OUTPATIENT)
Dept: SCHEDULING | Age: 77
End: 2022-04-26

## 2022-04-26 DIAGNOSIS — R91.1 SOLITARY PULMONARY NODULE: Primary | ICD-10-CM

## 2022-04-27 ENCOUNTER — HOSPITAL ENCOUNTER (OUTPATIENT)
Dept: RADIOLOGY | Age: 77
Discharge: HOME | End: 2022-04-27
Attending: INTERNAL MEDICINE
Payer: MEDICARE

## 2022-04-27 DIAGNOSIS — R91.1 SOLITARY PULMONARY NODULE: ICD-10-CM

## 2022-04-27 PROCEDURE — 71250 CT THORAX DX C-: CPT

## 2022-07-01 ENCOUNTER — APPOINTMENT (EMERGENCY)
Dept: RADIOLOGY | Facility: HOSPITAL | Age: 77
End: 2022-07-01
Payer: MEDICARE

## 2022-07-01 ENCOUNTER — HOSPITAL ENCOUNTER (EMERGENCY)
Facility: HOSPITAL | Age: 77
Discharge: HOME | End: 2022-07-01
Attending: EMERGENCY MEDICINE
Payer: MEDICARE

## 2022-07-01 VITALS
SYSTOLIC BLOOD PRESSURE: 175 MMHG | HEIGHT: 61 IN | RESPIRATION RATE: 19 BRPM | DIASTOLIC BLOOD PRESSURE: 78 MMHG | OXYGEN SATURATION: 98 % | HEART RATE: 65 BPM | BODY MASS INDEX: 29.83 KG/M2 | TEMPERATURE: 97.6 F | WEIGHT: 158 LBS

## 2022-07-01 DIAGNOSIS — R42 LIGHTHEADEDNESS: ICD-10-CM

## 2022-07-01 DIAGNOSIS — R06.02 SHORTNESS OF BREATH: Primary | ICD-10-CM

## 2022-07-01 LAB
ANION GAP SERPL CALC-SCNC: 8 MEQ/L (ref 3–15)
ATRIAL RATE: 73
BASOPHILS # BLD: 0.04 K/UL (ref 0.01–0.1)
BASOPHILS NFR BLD: 0.6 %
BUN SERPL-MCNC: 12 MG/DL (ref 8–20)
CALCIUM SERPL-MCNC: 9.9 MG/DL (ref 8.9–10.3)
CHLORIDE SERPL-SCNC: 106 MEQ/L (ref 98–109)
CO2 SERPL-SCNC: 23 MEQ/L (ref 22–32)
CREAT SERPL-MCNC: 0.8 MG/DL (ref 0.6–1.1)
DIFFERENTIAL METHOD BLD: ABNORMAL
EOSINOPHIL # BLD: 0.11 K/UL (ref 0.04–0.36)
EOSINOPHIL NFR BLD: 1.8 %
ERYTHROCYTE [DISTWIDTH] IN BLOOD BY AUTOMATED COUNT: 16.8 % (ref 11.7–14.4)
GFR SERPL CREATININE-BSD FRML MDRD: >60 ML/MIN/1.73M*2
GLUCOSE SERPL-MCNC: 97 MG/DL (ref 70–99)
HCT VFR BLDCO AUTO: 38.2 % (ref 35–45)
HGB BLD-MCNC: 11.8 G/DL (ref 11.8–15.7)
IMM GRANULOCYTES # BLD AUTO: 0.01 K/UL (ref 0–0.08)
IMM GRANULOCYTES NFR BLD AUTO: 0.2 %
LYMPHOCYTES # BLD: 1.34 K/UL (ref 1.2–3.5)
LYMPHOCYTES NFR BLD: 21.3 %
MAGNESIUM SERPL-MCNC: 2.1 MG/DL (ref 1.8–2.5)
MCH RBC QN AUTO: 23.6 PG (ref 28–33.2)
MCHC RBC AUTO-ENTMCNC: 30.9 G/DL (ref 32.2–35.5)
MCV RBC AUTO: 76.6 FL (ref 83–98)
MONOCYTES # BLD: 0.5 K/UL (ref 0.28–0.8)
MONOCYTES NFR BLD: 8 %
NEUTROPHILS # BLD: 4.28 K/UL (ref 1.7–7)
NEUTS SEG NFR BLD: 68.1 %
NRBC BLD-RTO: 0 %
P AXIS: 50
PDW BLD AUTO: 11 FL (ref 9.4–12.3)
PLATELET # BLD AUTO: 284 K/UL (ref 150–369)
POTASSIUM SERPL-SCNC: 3.9 MEQ/L (ref 3.6–5.1)
PR INTERVAL: 154
QRS DURATION: 88
QT INTERVAL: 416
QTC CALCULATION(BAZETT): 458
R AXIS: 22
RBC # BLD AUTO: 4.99 M/UL (ref 3.93–5.22)
SODIUM SERPL-SCNC: 137 MEQ/L (ref 136–144)
T WAVE AXIS: 22
TROPONIN I SERPL HS-MCNC: 5 PG/ML
TROPONIN I SERPL HS-MCNC: 5 PG/ML
TSH SERPL DL<=0.05 MIU/L-ACNC: 0.81 MIU/L (ref 0.34–5.6)
VENTRICULAR RATE: 73
WBC # BLD AUTO: 6.28 K/UL (ref 3.8–10.5)

## 2022-07-01 PROCEDURE — 36415 COLL VENOUS BLD VENIPUNCTURE: CPT

## 2022-07-01 PROCEDURE — 84484 ASSAY OF TROPONIN QUANT: CPT | Mod: 91 | Performed by: EMERGENCY MEDICINE

## 2022-07-01 PROCEDURE — 93005 ELECTROCARDIOGRAM TRACING: CPT

## 2022-07-01 PROCEDURE — 93005 ELECTROCARDIOGRAM TRACING: CPT | Performed by: EMERGENCY MEDICINE

## 2022-07-01 PROCEDURE — 84443 ASSAY THYROID STIM HORMONE: CPT | Performed by: PHYSICIAN ASSISTANT

## 2022-07-01 PROCEDURE — 84484 ASSAY OF TROPONIN QUANT: CPT | Performed by: EMERGENCY MEDICINE

## 2022-07-01 PROCEDURE — 80048 BASIC METABOLIC PNL TOTAL CA: CPT

## 2022-07-01 PROCEDURE — 99283 EMERGENCY DEPT VISIT LOW MDM: CPT | Mod: 25

## 2022-07-01 PROCEDURE — 71046 X-RAY EXAM CHEST 2 VIEWS: CPT

## 2022-07-01 PROCEDURE — 93010 ELECTROCARDIOGRAM REPORT: CPT | Performed by: INTERNAL MEDICINE

## 2022-07-01 PROCEDURE — 84484 ASSAY OF TROPONIN QUANT: CPT

## 2022-07-01 PROCEDURE — 85025 COMPLETE CBC W/AUTO DIFF WBC: CPT

## 2022-07-01 PROCEDURE — 83735 ASSAY OF MAGNESIUM: CPT | Performed by: PHYSICIAN ASSISTANT

## 2022-07-01 PROCEDURE — 80048 BASIC METABOLIC PNL TOTAL CA: CPT | Performed by: EMERGENCY MEDICINE

## 2022-07-01 PROCEDURE — 85025 COMPLETE CBC W/AUTO DIFF WBC: CPT | Performed by: EMERGENCY MEDICINE

## 2022-07-01 ASSESSMENT — ENCOUNTER SYMPTOMS
FEVER: 0
DIFFICULTY URINATING: 0
WEAKNESS: 0
DIZZINESS: 0
LIGHT-HEADEDNESS: 1
RHINORRHEA: 0
VOMITING: 0
AGITATION: 0
SINUS PRESSURE: 0
CHILLS: 0
SHORTNESS OF BREATH: 1
SORE THROAT: 0
ABDOMINAL PAIN: 0
TROUBLE SWALLOWING: 0
DIAPHORESIS: 0
NAUSEA: 0
PALPITATIONS: 0
COUGH: 0

## 2022-07-01 NOTE — DISCHARGE INSTRUCTIONS
Please review all discharge instructions as discussed.  Please be re-evaluated by your primary doctor as soon as possible and be sure to review all laboratory, radiology and other testing with your doctor.  Should your symptoms worsen or not improve, return to emergency room immediately.     As discussed, please follow up with your primary care doctor concerning your ER visit in the next 2-3 days.

## 2022-07-01 NOTE — ED ATTESTATION NOTE
I have personally seen and examined the patient.  I reviewed the note above and agree with the findings and plan of care, with an addendum documented below.    77 yo female with pmh of HTN, PE who presents with sob and dizziness.  Started while walking outside in the heat.  Has now resolved and feels back to baseline.  No chest pain, no fever/chills, no cough    AAO x 3  CV: S1S2 RRR  Lungs: CTA BL    AP: 77 yo female who felt sob and dizzy after walking outside in heat  Labs  CXR  EKG    MD Anu Moeller Kathryn A, MD  07/01/22 6271

## 2022-07-01 NOTE — ED PROVIDER NOTES
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  She felt well during the walk, however returned home and felt short of breath and unsteady on her feet.  Her symptoms lasted minutes, but then subsided.  She thought that it may have been due to the heat outside.  She tried to hydrate and drink plenty of water and now has no shortness of breath or lightheadedness symptoms.  During the episode, she also noticed fluctuations in her heart rate on her apple watch, however her EKG did not appear abnormal on her apple watch.  She has no known history of cardiac arrhythmias or A. fib, or thyroid dysfunction.  She is compliant with her Xarelto and does not miss any doses.  She denied any fever/chills, cough/URI symptoms, diaphoresis, chest pain, palpitations, pleuritic pain, leg swelling, calf pain, nausea/vomiting, syncope.            Patient History   PAST HISTORY     Reviewed from Nursing Triage:         Past Medical History:   Diagnosis Date   • Hypertension    • PE (pulmonary thromboembolism) (CMS/[facility])    • Reflux esophagitis        No past surgical history on file.    No family history on file.    Social History     Tobacco Use   • Smoking status: Never Smoker   • Smokeless tobacco: Never Used   Substance Use Topics   • Alcohol use: No   • Drug use: Not Currently         Review of Systems   REVIEW OF SYSTEMS     Review of Systems   Constitutional: Negative for chills, diaphoresis and fever.   HENT: Negative for congestion, ear pain, rhinorrhea, sinus pressure, sore throat and trouble swallowing.    Respiratory: Positive for shortness of breath. Negative for cough.    Cardiovascular: Negative for chest pain, palpitations and leg swelling.   Gastrointestinal: Negative for abdominal pain, nausea and vomiting.   Genitourinary: Negative for  difficulty urinating.   Musculoskeletal: Negative for gait problem.   Neurological: Positive for light-headedness. Negative for dizziness, syncope and weakness.   Psychiatric/Behavioral: Negative for agitation and behavioral problems.         VITALS     ED Vitals    Date/Time Temp Pulse Resp BP SpO2 [facility]   07/01/22 1630 -- 69 20 143/63 98 % JJV   07/01/22 1526 36.4 °C (97.6 °F) 76 16 182/84 98 % VMM        Pulse Ox %: 98 % (07/01/22 1709)  Pulse Ox Interpretation: Normal (07/01/22 1709)  Heart Rate: 73 (07/01/22 1709)  Rhythm Strip Interpretation: Normal Sinus Rhythm (07/01/22 1709)     Physical Exam   PHYSICAL EXAM     Physical Exam  Constitutional:       General: She is not in acute distress.     Appearance: Normal appearance. She is well-developed and normal weight. She is not ill-appearing, toxic-appearing or diaphoretic.   HENT:      Head: Normocephalic.   Eyes:      Conjunctiva/sclera: Conjunctivae normal.   Cardiovascular:      Rate and Rhythm: Normal rate and regular rhythm.      Pulses: Normal pulses.      Heart sounds: Normal heart sounds. No murmur heard.  Pulmonary:      Effort: Pulmonary effort is normal. No tachypnea, bradypnea, accessory muscle usage or respiratory distress.      Breath sounds: Normal breath sounds. No decreased breath sounds, wheezing, rhonchi or rales.   Abdominal:      General: Bowel sounds are normal. There is no distension.      Palpations: Abdomen is soft. There is no mass.      Tenderness: There is no abdominal tenderness. There is no guarding or rebound.   Musculoskeletal:         General: No tenderness. Normal range of motion.      Cervical back: Normal range of motion.      Right lower leg: No tenderness. No edema.      Left lower leg: No tenderness. No edema.   Skin:     General: Skin is warm and dry.   Neurological:      Mental Status: She is alert and oriented to person, place, and time. Mental status is at baseline.   Psychiatric:         Mood and Affect: Mood normal.          Behavior: Behavior normal.           PROCEDURES     Procedures     DATA     Results     Procedure Component Value Units Date/Time    HS Troponin I (with 2 hour reflex) [[id number]]  (Normal) Collected: 07/01/22 1605    Specimen: Blood, Venous Updated: 07/01/22 1645     High Sens Troponin I 5 pg/mL     Basic metabolic panel [[id number]]  (Normal) Collected: 07/01/22 1605    Specimen: Blood, Venous Updated: 07/01/22 1638     Sodium 137 mEQ/L      Potassium 3.9 mEQ/L      Comment: Results obtained on plasma. Plasma Potassium values may be up to 0.4 mEQ/L less than serum values. The differences may be greater for patients with high platelet or white cell counts.        Chloride 106 mEQ/L      CO2 23 mEQ/L      BUN 12 mg/dL      Creatinine 0.8 mg/dL      Glucose 97 mg/dL      Calcium 9.9 mg/dL      eGFR >60.0 mL/min/1.73m*2      Anion Gap 8 mEQ/L     CBC and differential [[id number]]  (Abnormal) Collected: 07/01/22 1605    Specimen: Blood, Venous Updated: 07/01/22 1623     WBC 6.28 K/uL      RBC 4.99 M/uL      Hemoglobin 11.8 g/dL      Hematocrit 38.2 %      MCV 76.6 fL      MCH 23.6 pg      MCHC 30.9 g/dL      RDW 16.8 %      Platelets 284 K/uL      MPV 11.0 fL      Differential Type Auto     nRBC 0.0 %      Immature Granulocytes 0.2 %      Neutrophils 68.1 %      Lymphocytes 21.3 %      Monocytes 8.0 %      Eosinophils 1.8 %      Basophils 0.6 %      Immature Granulocytes, Absolute 0.01 K/uL      Neutrophils, Absolute 4.28 K/uL      Lymphocytes, Absolute 1.34 K/uL      Monocytes, Absolute 0.50 K/uL      Eosinophils, Absolute 0.11 K/uL      Basophils, Absolute 0.04 K/uL           Imaging Results          X-RAY CHEST 2 VIEWS (Final result)  Result time 07/01/22 16:25:48    Final result                 Impression:    IMPRESSION: Hiatal hernia.  No definite acute cardiopulmonary disease.             Narrative:    CLINICAL HISTORY: Shortness of breath    COMMENT: Frontal and lateral views of the chest demonstrates normal  size heart.  There is a hiatal hernia.  The lung fields are free of focal infiltrate or  consolidative opacities.  No pleural effusion.                                ECG 12 lead             Scoring tools                                 ED Course & MDM   MDM / ED COURSE / CLINICAL IMPRESSIONS / DISPO     MDM  Number of Diagnoses or Management Options  Lightheadedness  Shortness of breath  Diagnosis management comments: DDx includes cardiac arrhythmia, electrolyte abnormality, thyroid dysfunction, dehydration among other pathology. No c/o chest pain, atypical for ACS. Symptoms completely resolved upon arrival to ED. Labs overall unremarkable.  EKG demonstrates NSR without ischemic changes and troponin negative x2.  Ambulatory pulse ox of 90% without shortness of breath symptoms.  She appears well with complete resolution of symptoms and ambulated with steady gait in the ED.  She appears hemodynamically stable for discharge to follow-up with her PCP concerning ER visit.  Discussed with supervising attending who evaluated patient and agrees with this discharge plan.       Amount and/or Complexity of Data Reviewed  Clinical lab tests: reviewed  Tests in the radiology section of CPT®: reviewed  Tests in the medicine section of CPT®: reviewed        ED Course as of 07/01/22 1956 Fri Jul 01, 2022   1847 High Sens Troponin I: 5  Unchanged from prior [BF]   1941 Ambulatory pulse ox 98% without shortness of breath symptoms. [BF]      ED Course User Index  [BF] Leslee Gonzalez PA C         Clinical Impressions as of 07/01/22 1956   Shortness of breath   Lightheadedness              Leslee Gonzalez PA C  07/01/22 2021